# Patient Record
(demographics unavailable — no encounter records)

---

## 2025-05-15 NOTE — PHYSICAL EXAM
[No Acute Distress] : no acute distress [Well Nourished] : well nourished [Well Developed] : well developed [Well-Appearing] : well-appearing [Normal Voice/Communication] : normal voice/communication [Normal Sclera/Conjunctiva] : normal sclera/conjunctiva [PERRL] : pupils equal round and reactive to light [EOMI] : extraocular movements intact [Normal Outer Ear/Nose] : the outer ears and nose were normal in appearance [Normal Oropharynx] : the oropharynx was normal [Normal TMs] : both tympanic membranes were normal [Normal Nasal Mucosa] : the nasal mucosa was normal [No JVD] : no jugular venous distention [No Lymphadenopathy] : no lymphadenopathy [Supple] : supple [Thyroid Normal, No Nodules] : the thyroid was normal and there were no nodules present [No Respiratory Distress] : no respiratory distress  [No Accessory Muscle Use] : no accessory muscle use [Clear to Auscultation] : lungs were clear to auscultation bilaterally [Normal Rate] : normal rate  [Regular Rhythm] : with a regular rhythm [Normal S1, S2] : normal S1 and S2 [No Murmur] : no murmur heard [No Carotid Bruits] : no carotid bruits [No Abdominal Bruit] : a ~M bruit was not heard ~T in the abdomen [No Varicosities] : no varicosities [Pedal Pulses Present] : the pedal pulses are present [No Edema] : there was no peripheral edema [No Palpable Aorta] : no palpable aorta [Soft] : abdomen soft [Non Tender] : non-tender [Non-distended] : non-distended [No Masses] : no abdominal mass palpated [No HSM] : no HSM [Normal Bowel Sounds] : normal bowel sounds [Declined Rectal Exam] : declined rectal exam [Normal Supraclavicular Nodes] : no supraclavicular lymphadenopathy [Normal Posterior Cervical Nodes] : no posterior cervical lymphadenopathy [Normal Anterior Cervical Nodes] : no anterior cervical lymphadenopathy [No CVA Tenderness] : no CVA  tenderness [No Spinal Tenderness] : no spinal tenderness [No Joint Swelling] : no joint swelling [Grossly Normal Strength/Tone] : grossly normal strength/tone [No Rash] : no rash [Coordination Grossly Intact] : coordination grossly intact [No Focal Deficits] : no focal deficits [Normal Gait] : normal gait [Deep Tendon Reflexes (DTR)] : deep tendon reflexes were 2+ and symmetric [Speech Grossly Normal] : speech grossly normal [Memory Grossly Normal] : memory grossly normal [Normal Affect] : the affect was normal [Alert and Oriented x3] : oriented to person, place, and time [Normal Mood] : the mood was normal [Normal Insight/Judgement] : insight and judgment were intact [Kyphosis] : no kyphosis [Scoliosis] : no scoliosis [Acne] : no acne [de-identified] : done by GYN (Dr. Ortega) [FreeTextEntry1] : to be done by GI

## 2025-05-15 NOTE — HEALTH RISK ASSESSMENT
[Yes] : Yes [Monthly or less (1 pt)] : Monthly or less (1 point) [1 or 2 (0 pts)] : 1 or 2 (0 points) [Never (0 pts)] : Never (0 points) [No] : In the past 12 months have you used drugs other than those required for medical reasons? No [0] : 2) Feeling down, depressed, or hopeless: Not at all (0) [PHQ-2 Negative - No further assessment needed] : PHQ-2 Negative - No further assessment needed [Audit-CScore] : 1 [[id number]] : 0 [Reports changes in hearing] : Reports no changes in hearing [Reports changes in vision] : Reports no changes in vision [MammogramComments] : Rx given for Mammogram and Breast US. [PapSmearComments] : Follow up with Dr. Ortega; results to be requested.

## 2025-05-15 NOTE — HEALTH RISK ASSESSMENT
[Yes] : Yes [Monthly or less (1 pt)] : Monthly or less (1 point) [1 or 2 (0 pts)] : 1 or 2 (0 points) [Never (0 pts)] : Never (0 points) [No] : In the past 12 months have you used drugs other than those required for medical reasons? No [0] : 2) Feeling down, depressed, or hopeless: Not at all (0) [PHQ-2 Negative - No further assessment needed] : PHQ-2 Negative - No further assessment needed [Audit-CScore] : 1 [[id number]] : 0 [Reports changes in hearing] : Reports no changes in hearing [Reports changes in vision] : Reports no changes in vision [MammogramComments] : Rx given for Mammogram and Breast US. [PapSmearComments] : Follow up with Dr. Ortega; results to be requested.

## 2025-05-15 NOTE — ASSESSMENT
[Vaccines Reviewed] : Immunizations reviewed today. Please see immunization details in the vaccine log within the immunization flowsheet.  [FreeTextEntry1] : Patient is a 47-year-old female with a past medical history as above who presents for an annual wellness visit.

## 2025-05-15 NOTE — HISTORY OF PRESENT ILLNESS
[FreeTextEntry1] : annual wellness visit [de-identified] : Patient is a 47-year-old female with a past medical history as below who presents for an annual wellness visit.  Patient states she is taking all medications as prescribed and denies any adverse reactions or side effects. She denies heat/cold intolerance on Synthroid (Levothyroxine Sodium). Anxiety has been well-managed on Trintellix.  The patient reports she started exercising regularly since 11/2023. She runs on the treadmill for 2-3 miles daily and reports it is therapeutic for her. She goes to the gym 4-5x a week and does resistance training. Her diet has improved and she lost 11 pounds over the past year. She does not follow a specific diet, but she tries to eat a lot of protein.    Patient notes temperature sensitivity, and intermittent dizziness for the past year which she relates to being perimenopausal. Her menstrual cycle is regular. The dizziness is usually alleviated with hydration.  She has not had episodes of syncope.  Her vision and hearing are stable.   Age-appropriate screenings Last GYN visit with Dr. Ortega in 2022, has appointment with Dr. Dobson- The patients last mammogram/sonogram was May of 2023. The patient has yet to get a colonoscopy, but she plans on getting it this summer. Patient has scheduled appointment in July of 2025 with Dr. Dobson, GYN. She requests Rx for the mammogram/sonogram today.    Vaccinations The patient did not receive the flu vaccine for the 1098-5317 influenza season. She has not received the Tetanus Vaccine in the past 10 years. Patient has received 2 doses of the COVID-19 Vaccine.   The patient has a hx of SLE but reports all her issues resolved when she was a teenager. She presented to rheum for an elevated LA over the past decade and was told that her case is borderline and did not require treatment. She also has a hx of Hashimotos and ITP, but she has been asymptomatic.

## 2025-05-15 NOTE — PHYSICAL EXAM
[No Acute Distress] : no acute distress [Well Nourished] : well nourished [Well Developed] : well developed [Well-Appearing] : well-appearing [Normal Voice/Communication] : normal voice/communication [Normal Sclera/Conjunctiva] : normal sclera/conjunctiva [PERRL] : pupils equal round and reactive to light [EOMI] : extraocular movements intact [Normal Outer Ear/Nose] : the outer ears and nose were normal in appearance [Normal Oropharynx] : the oropharynx was normal [Normal TMs] : both tympanic membranes were normal [Normal Nasal Mucosa] : the nasal mucosa was normal [No JVD] : no jugular venous distention [No Lymphadenopathy] : no lymphadenopathy [Supple] : supple [Thyroid Normal, No Nodules] : the thyroid was normal and there were no nodules present [No Respiratory Distress] : no respiratory distress  [No Accessory Muscle Use] : no accessory muscle use [Clear to Auscultation] : lungs were clear to auscultation bilaterally [Normal Rate] : normal rate  [Regular Rhythm] : with a regular rhythm [Normal S1, S2] : normal S1 and S2 [No Murmur] : no murmur heard [No Carotid Bruits] : no carotid bruits [No Abdominal Bruit] : a ~M bruit was not heard ~T in the abdomen [No Varicosities] : no varicosities [Pedal Pulses Present] : the pedal pulses are present [No Edema] : there was no peripheral edema [No Palpable Aorta] : no palpable aorta [Soft] : abdomen soft [Non Tender] : non-tender [Non-distended] : non-distended [No Masses] : no abdominal mass palpated [No HSM] : no HSM [Normal Bowel Sounds] : normal bowel sounds [Declined Rectal Exam] : declined rectal exam [Normal Supraclavicular Nodes] : no supraclavicular lymphadenopathy [Normal Posterior Cervical Nodes] : no posterior cervical lymphadenopathy [Normal Anterior Cervical Nodes] : no anterior cervical lymphadenopathy [No CVA Tenderness] : no CVA  tenderness [No Spinal Tenderness] : no spinal tenderness [No Joint Swelling] : no joint swelling [Grossly Normal Strength/Tone] : grossly normal strength/tone [No Rash] : no rash [Coordination Grossly Intact] : coordination grossly intact [No Focal Deficits] : no focal deficits [Normal Gait] : normal gait [Deep Tendon Reflexes (DTR)] : deep tendon reflexes were 2+ and symmetric [Speech Grossly Normal] : speech grossly normal [Memory Grossly Normal] : memory grossly normal [Normal Affect] : the affect was normal [Alert and Oriented x3] : oriented to person, place, and time [Normal Mood] : the mood was normal [Normal Insight/Judgement] : insight and judgment were intact [Kyphosis] : no kyphosis [Scoliosis] : no scoliosis [Acne] : no acne [de-identified] : done by GYN (Dr. Ortega) [FreeTextEntry1] : to be done by GI

## 2025-05-15 NOTE — HISTORY OF PRESENT ILLNESS
[FreeTextEntry1] : annual wellness visit [de-identified] : Patient is a 47-year-old female with a past medical history as below who presents for an annual wellness visit.  Patient states she is taking all medications as prescribed and denies any adverse reactions or side effects. She denies heat/cold intolerance on Synthroid (Levothyroxine Sodium). Anxiety has been well-managed on Trintellix.  The patient reports she started exercising regularly since 11/2023. She runs on the treadmill for 2-3 miles daily and reports it is therapeutic for her. She goes to the gym 4-5x a week and does resistance training. Her diet has improved and she lost 11 pounds over the past year. She does not follow a specific diet, but she tries to eat a lot of protein.    Patient notes temperature sensitivity, and intermittent dizziness for the past year which she relates to being perimenopausal. Her menstrual cycle is regular. The dizziness is usually alleviated with hydration.  She has not had episodes of syncope.  Her vision and hearing are stable.   Age-appropriate screenings Last GYN visit with Dr. Ortega in 2022, has appointment with Dr. Dobson- The patients last mammogram/sonogram was May of 2023. The patient has yet to get a colonoscopy, but she plans on getting it this summer. Patient has scheduled appointment in July of 2025 with Dr. Dobson, GYN. She requests Rx for the mammogram/sonogram today.    Vaccinations The patient did not receive the flu vaccine for the 2652-5222 influenza season. She has not received the Tetanus Vaccine in the past 10 years. Patient has received 2 doses of the COVID-19 Vaccine.   The patient has a hx of SLE but reports all her issues resolved when she was a teenager. She presented to rheum for an elevated LA over the past decade and was told that her case is borderline and did not require treatment. She also has a hx of Hashimotos and ITP, but she has been asymptomatic.

## 2025-05-15 NOTE — PLAN
[FreeTextEntry1] : GI colorectal cancer screening Referred to Dr. Ashwini August initial consultation for a colonoscopy Endocrinology hypothyroidism - continue Synthroid (Levothyroxine Sodium) 100mcg p.o.q.d. as directed - check thyroid panel Gynecology Follow up with GYN, Dr. Dobson for annual visit in July of 2025; results of pap smear to be requested; Rx given for Mammogram and Breast US Psychiatry anxiety - continue Trintellix 10mg p.o.q.d. as directed, Rx filled    Immunization Tdap (Adacel) Vaccine - discussed, patient to consider and follow up for vaccine administration if interested S/p COVID-19 Vaccine (x2) - recommended following up at pharmacy for booster dose of vaccine  Blood work drawn at the office today check EKG (results as above), female panel, hemoglobin A1C, CBC, CMP, FLP, Vit D, and thyroid panel

## 2025-05-15 NOTE — END OF VISIT
[FreeTextEntry3] : I, Felicita Barron, acted solely as scribe for Dr. Rod Mcdaniels DO on this date 05/15/2025.   All medical record entries made by the Scribe were at my, Dr. Rod Mcdaniels DO direction and personally dictated by me on 05/15/2025, I have reviewed the chart and agree that the record accurately reflects my personal performance of the history, physical exam, assessment and plan. I have also personally directed, reviewed and agreed with the chart.     [Time Spent: ___ minutes] : I have spent [unfilled] minutes of time on the encounter which excludes teaching and separately reported services.

## 2025-07-16 NOTE — PLAN
[FreeTextEntry1] : 47 year old female for annual visit.   Plan: - Pap/HPV done today  - Pt has Mammo scheduled  - Pt has Colonoscopy scheduled  - RTO 1 year

## 2025-07-16 NOTE — HISTORY OF PRESENT ILLNESS
[FreeTextEntry1] : 46 y/o female presents for annual visit. She is doing well with no complaints. Periods are regular. Pt has occasional mood swings, feeling warm.

## 2025-07-16 NOTE — END OF VISIT
[FreeTextEntry3] : I, Tiffanie Vela acted as a scribe on behalf of Dr. Barbara Dobson D.O. on 07/16/2025.   All medical entries made by the scribe were at my, Dr. Barbara Dobson D.O., direction and personally dictated by me on 07/16/2025. I have reviewed the chart and agree that the record accurately reflects my personal performance of the history, physical exam, assessment and plan. I have also personally directed, reviewed, and agreed with the chart.

## 2025-07-28 NOTE — PHYSICAL EXAM
[Normal] : the sclera and conjunctiva were normal [Hearing Threshold Finger Rub Not [place]] : hearing was normal [Normal Appearance] : the appearance of the neck was normal [No Respiratory Distress] : no respiratory distress [Heart Rate And Rhythm] : heart rate was normal and rhythm regular [Bowel Sounds] : normal bowel sounds [Abdomen Tenderness] : non-tender [Abdomen Soft] : soft [Oriented To Time, Place, And Person] : oriented to person, place, and time

## 2025-07-28 NOTE — PHYSICAL EXAM
[Normal] : the sclera and conjunctiva were normal [Hearing Threshold Finger Rub Not [place]] : hearing was normal [Normal Appearance] : the appearance of the neck was normal [No Respiratory Distress] : no respiratory distress [Heart Rate And Rhythm] : heart rate was normal and rhythm regular [Bowel Sounds] : normal bowel sounds [Abdomen Tenderness] : non-tender [Abdomen Soft] : soft [Oriented To Time, Place, And Person] : oriented to person, place, and time

## 2025-07-30 NOTE — ASSESSMENT
[FreeTextEntry1] : 47-year-old female with PMHx of anxiety, elevated LA and hypothyroidism presents for initial appointment to establish care. Today, pt reports that she is in need of a routine screening colonoscopy. Denies any upper or lower GI symptoms. Denies any FHx of GI issues. 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 Previous history of severe anxiety and nausea with vomiting when she had anesthesia for her C-sections and is concerned about procedure.   Recommend pt schedule a colonoscopy. Pt prefers SuTab bowel prep. Hold vitamins for 5 days prior to procedure.   The patient will proceed with a colonoscopy. I explained to the patient the risks, alternatives and benefits to a colonoscopy. Risk including but not limited to bleeding, perforation, infection adverse medication reaction. Questions were answered. Agreeable to proceed with the planned procedures.   The patient will hold NSAIDs and vitamins/supplements for 5 days prior. Otherwise continue medications as prescribed. The patient is not on diabetes medications or anticoagulation.   Patient seen and examined, overseeing documentation by Filipe Dove. Will proceed with a colonoscopy. Medications as usual. Reviewed and reconciled medications, allergies, PMHx, PSHx, SocHx, FMHx. Reviewed imaging, blood work, diagnostic testing, discussed with patient. Further recommendations pending results of above work-up and evaluation.  All questions answered Call with any questions or concerns Time spent before and after visit reviewing patient's chart

## 2025-07-30 NOTE — HISTORY OF PRESENT ILLNESS
[FreeTextEntry1] : 47-year-old female with PMHx of anxiety, elevated LA and hypothyroidism presents for initial appointment to establish care. Today, pt reports that she is in need of a routine screening colonoscopy. Denies any upper or lower GI symptoms. Denies any FHx of GI issues. 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 Previous history of severe anxiety and nausea with vomiting when she had anesthesia for her C-sections and is concerned about procedure.

## 2025-07-30 NOTE — CONSULT LETTER
[Dear  ___] : Dear  [unfilled], [Consult Letter:] : I had the pleasure of evaluating your patient, [unfilled]. [Please see my note below.] : Please see my note below. [Consult Closing:] : Thank you very much for allowing me to participate in the care of this patient.  If you have any questions, please do not hesitate to contact me. [Sincerely,] : Sincerely, [FreeTextEntry3] : Dr. Valeria Patel